# Patient Record
Sex: FEMALE | Race: WHITE | URBAN - METROPOLITAN AREA
[De-identification: names, ages, dates, MRNs, and addresses within clinical notes are randomized per-mention and may not be internally consistent; named-entity substitution may affect disease eponyms.]

---

## 2023-02-06 ENCOUNTER — OFFICE VISIT (OUTPATIENT)
Dept: INTERNAL MEDICINE CLINIC | Age: 48
End: 2023-02-06
Payer: COMMERCIAL

## 2023-02-06 VITALS
HEIGHT: 66 IN | OXYGEN SATURATION: 99 % | HEART RATE: 60 BPM | WEIGHT: 133 LBS | DIASTOLIC BLOOD PRESSURE: 92 MMHG | BODY MASS INDEX: 21.38 KG/M2 | RESPIRATION RATE: 16 BRPM | SYSTOLIC BLOOD PRESSURE: 138 MMHG | TEMPERATURE: 98.8 F

## 2023-02-06 DIAGNOSIS — M75.41 IMPINGEMENT SYNDROME OF RIGHT SHOULDER: Primary | ICD-10-CM

## 2023-02-06 PROCEDURE — 99214 OFFICE O/P EST MOD 30 MIN: CPT | Performed by: FAMILY MEDICINE

## 2023-02-06 RX ORDER — NAPROXEN 500 MG/1
500 TABLET ORAL 2 TIMES DAILY WITH MEALS
Qty: 30 TABLET | Refills: 1 | Status: SHIPPED | OUTPATIENT
Start: 2023-02-06

## 2023-02-06 NOTE — PROGRESS NOTES
Chief Complaint   Patient presents with    Shoulder Pain     Patient is here for right shoulder pain     she is a 41y.o. year old female who presents for evaluation of right shoulder   Pain Assessment Encounter      [facility]  2/6/2023  Onset of Symptoms: Patient states she has had pain for months   ________________________________________________________________________  Description:Patient states she has pole class and thinks that's how she may have injured her shoulder. Patient states she notice more pain when rotating     Frequency: 3-4 times a day  Pain Scale:(1-10): 6  Trauma Hx: none  Hx of similar symptoms: No:   Radiation: YES, arm  Duration:  continuous      Progression: has worsened  What makes it better?: OTC meds  What makes it worse?:exercise and strecthing  Medications tried: acetaminophen    Reviewed and agree with Nurse Note and duplicated in this note. Reviewed PmHx, RxHx, FmHx, SocHx, AllgHx and updated and dated in the chart. No family history on file. No past medical history on file. Social History     Socioeconomic History    Marital status:         Review of Systems - negative except as listed above      Objective:     Vitals:    02/06/23 1323   BP: (!) 138/92   Pulse: 60   Resp: 16   Temp: 98.8 °F (37.1 °C)   SpO2: 99%   Weight: 133 lb (60.3 kg)   Height: 5' 6\" (1.676 m)       Physical Examination: General appearance - alert, well appearing, and in no distress  Back exam - full range of motion, no tenderness, palpable spasm or pain on motion  Neurological - alert, oriented, normal speech, no focal findings or movement disorder noted  Musculoskeletal - The right shoulder is  normal to inspection. The patient has diminished range of motion  . The shoulderis not tender to palpation . Bicep tendon: non-tender  The NEER test is positive. The Claudio test:is positive   The Cross over test:is  negative. The Empty Can test:is  positive.   Stressed ext rotation is: negative. Stressed int rotation: negative. The Apprehension Sign is negative. The Lift off test is:  negative   Examination reveals the Painful Arch:  positive. The Labral Test is:  negative. The Sulcus Sign is:  negative. Extremities - peripheral pulses normal, no pedal edema, no clubbing or cyanosis  Skin - normal coloration and turgor, no rashes, no suspicious skin lesions noted   Indications for study: left shoulder pain      A limited  musculoskeletal ultrasound examination was performed on the left shoulder with the following findings: Biceps (LHB) tendon - long:  normal echogenic, linearly compact fibrillar appearance   Biceps (LHB) tendon - trans:  normal echogenic, tessellate compact fibrillar appearance   Subscapularis tendon - long: normal echogenic, linearly compact fibrillar birds-beak appearance   Subscapularis tendon - trans:  normal echogenic, tessellate compact fibrillar three-bundled appearance   Acromioclavicular joint - trans: normal joint-space without effusion or intra-articular debris   Subacromial (SA) impingement testing: Decreased maintenance of SA space and restricted supraspinatus glide  Supraspinatus tendon - long: normal echogenic, linearly compact fibrillar birds-beak appearance     Impression: Shoulder impingement    Scanned and Interpreted by Brenda Chery [facility]       Assessment/ Plan:   Diagnoses and all orders for this visit:    1.  Impingement syndrome of right shoulder  -     XR SHOULDER RT AP/LAT MIN 2 V; Future  -     REFERRAL TO PHYSICAL THERAPY       Pathophysiology, recovery and rehabilitation process discussed and questions answered   Counseling for 30 Minutes of the total visit duration   Pictures and figures used as necessary   Provided reassurance   Monitor response to home exercises   Recommend activity modification   Recommend  lower impact activities-walking, Eliptical, Nordic Track, cycling or swimming              1) Remember to stay active and/or exercise regularly (I suggest 30-45 minutes daily)   2) For reliable dietary information, go to www. EATRIGHT.org. You may wish to consider seeing the nutritionist at [facility] [phone], also consider the [address] I have discussed the diagnosis with the patient and the intended plan as seen in the above orders. The patient has received an after-visit summary and questions were answered concerning future plans. Medication Side Effects and Warnings were discussed with patient,  Patient Labs were reviewed and or requested, and  Patient Past Records were reviewed and or requested  yes      Pt agrees to call or return to clinic and/or go to closest ER with any worsening of symptoms. This may include, but not limited to increased fever (>100.4) with NSAIDS or Tylenol, increased edema, confusion, rash, worsening of presenting symptoms. Please note that this dictation was completed with [employer], the computer voice recognition software. Quite often unanticipated grammatical, syntax, homophones, and other interpretive errors are inadvertently transcribed by the computer software. Please disregard these errors. Please excuse any errors that have escaped final proofreading. Thank you.

## 2023-02-15 ENCOUNTER — HOSPITAL ENCOUNTER (OUTPATIENT)
Dept: PHYSICAL THERAPY | Age: 48
Discharge: HOME | End: 2023-02-15
Payer: COMMERCIAL

## 2023-02-15 DIAGNOSIS — M75.41 IMPINGEMENT SYNDROME OF RIGHT SHOULDER: Primary | ICD-10-CM

## 2023-02-15 PROCEDURE — 97110 THERAPEUTIC EXERCISES: CPT | Performed by: PHYSICAL THERAPIST

## 2023-02-15 PROCEDURE — 97161 PT EVAL LOW COMPLEX 20 MIN: CPT | Performed by: PHYSICAL THERAPIST

## 2023-02-15 NOTE — PROGRESS NOTES
PT INITIAL EVALUATION NOTE 2-15    Patient Name: Peggy Pena  Date:2/15/2023  : 1975  [x]  Patient  Verified  Payor: Zack Brown / Plan: Shayla Segura PPO / Product Type: PPO /    In time:1005a  Out time:1045a  Total Treatment Time (min): 40  Visit #: 1     Treatment Area: Impingement syndrome of right shoulder [M75.41]    SUBJECTIVE  Pain Level (0-10 scale): 2  Any medication changes, allergies to medications, adverse drug reactions, diagnosis change, or new procedure performed?: [] No    [x] Yes (see summary sheet for update)  Subjective:     Patient reports with R shoulder pain dating back to 2022. She was doing a pole class and noticed some of the repetitive movements bothered her. She hasn't gone back to classes since December. She likes to participate in aerial classes. Overall feels her symptoms are about the same since December. She works as a  at [employer], and notices pain at work. 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 Description of symptoms: lateral upper arm near deltoid insertion  Aggravating Factors: reaching behind back, reaching out, working, aerial/pole classes  Alleviating Factors: rest, naproxen (possibly)    Radiation: none reported    Patient reports functional limitations with: aerial/pole classes, working, reaching behind back, getting dressed/undressed    OBJECTIVE/EXAMINATION  Posture:  mild forward shoulders  Other Observations:  upper trap compensatory movements with shoulder elevation  Palpation: TTP around anterior and lateral shoulder       (active/passive)  (active/passive)  Shoulder ROM:   R    L   Flexion   120/135deg   165deg   Abduction  125deg    160deg     IR   L5 p!/35deg   T6   ER   T2/30deg p!    T5    Joint Mobility Assessment: Glenohumeral: reduced posterior mobility UPPER QUARTER   MUSCLE STRENGTH  KEY       R  L  0 - No Contraction   Flexion  --  --  1 - Trace    Extension --  --  2 - Poor    Abduction --  --  3 - Fair     IR  5  5  4 - Good    ER  4+  5  5 - Normal    Mid-Trap --  --    Neurological: Reflexes / Sensations: nt  Special Tests: Antonia: (+) Repeated scaption: increased protraction in the scapula      Load and Shift: (-)            10 min Therapeutic Exercise:  [x] See flow sheet :   Rationale: increase ROM and increase strength to improve the patients ability to use arm without pain          With   [] TE   [] TA   [] Neuro   [] SC   [] other: Patient Education: [x] Review HEP    [] Progressed/Changed HEP based on:   [] positioning   [] body mechanics   [] transfers   [] heat/ice application    [] other:        Other Objective/Functional Measures: FOTO Functional Measure: 61/100    Pain Level (0-10 scale) post treatment: 2      ASSESSMENT:      [x]  See Plan of GARIMA David, DPT 2/15/2023

## 2023-02-15 NOTE — THERAPY EVALUATION
Physical Therapy at [facility],   a part of [facility]  [address] [address], [address] [place] [address], [address] [place] [address]  Phone: [phone]  Fax: [phone]    Plan of Care/Statement of Necessity for Physical Therapy Services  2-15    Patient name: Lexa Yepez  : 1975  Provider#: [id number]  Referral source: Trsih Sam MD      Medical/Treatment Diagnosis: Impingement syndrome of right shoulder [M75.41]     Prior Hospitalization: see medical history     Comorbidities: none reported  Prior Level of Function: able to use arm for pole classes and working as a  without difficulty  Medications: Verified on Patient Summary List    Start of Care: 2/15/23      Onset Date: 2022       The Plan of Care and following information is based on the information from the initial evaluation. Assessment/ key information: Patient reports with 6 month history of R shoulder pain and limited function. She has positive impingement signs, and also a significant loss of ROM in a capsular pattern. Presentation consistent with R shoulder impingement with secondary adhesive capsulitis.      Evaluation Complexity History LOW Complexity : Zero comorbidities / personal factors that will impact the outcome / POC; Examination HIGH Complexity : 4+ Standardized tests and measures addressing body structure, function, activity limitation and / or participation in recreation  ;Presentation LOW Complexity : Stable, uncomplicated  ;Clinical Decision Making MEDIUM Complexity : FOTO score of 26-74  Overall Complexity Rating: LOW     Problem List: pain affecting function, reduced ROM, reduced strength  Treatment Plan may include any combination of the following: Therapeutic exercise, Neuromuscular reeducation, Manual therapy, Therapeutic activity, Self care/home management, Electric stim unattended , and Vasopneumatic device  Patient / Family readiness to learn indicated by: asking questions and interest  Persons(s) to be included in education: patient (P)  Barriers to Learning/Limitations: None  Patient Goal (s): healing injury  Patient Self Reported Health Status: good  Rehabilitation Potential: fair    Long Term Goals: To be accomplished in 12-16 treatments:   1. Patient will be able to achieve at least 70deg of passive ER for improved function   2. Patient will be able to reach behind her back to put on her apron without pain   3. Pt will be able to work on the floor at [employer] without increased pain   4. Pt will be able to self-manage care using updated HEP for improved independence   5. Improved FOTO score to 71 or better to demonstrate improved function  Frequency / Duration: Patient to be seen 1-2 times per week for 12-16 treatments. Patient/ Caregiver education and instruction: self care and exercises    [x]  Plan of care has been reviewed with PTA Dollie Blizzard, PT, DPT 2/15/2023     ________________________________________________________________________    I certify that the above Therapy Services are being furnished while the patient is under my care. I agree with the treatment plan and certify that this therapy is necessary.     [de-identified] Signature:____________________  Date:____________Time: _________      Hector Goldman MD

## 2023-02-22 ENCOUNTER — HOSPITAL ENCOUNTER (OUTPATIENT)
Dept: PHYSICAL THERAPY | Age: 48
Discharge: HOME | End: 2023-02-22
Payer: COMMERCIAL

## 2023-02-22 PROCEDURE — 97140 MANUAL THERAPY 1/> REGIONS: CPT

## 2023-02-22 PROCEDURE — 97110 THERAPEUTIC EXERCISES: CPT

## 2023-02-22 NOTE — PROGRESS NOTES
PT DAILY TREATMENT NOTE - [facility] 2-15    Patient Name: Arely Lee  Date:2023  : 1975  [x]  Patient  Verified  Payor: Shana Wilson / Plan: Selene Apley PPO / Product Type: PPO /    In time: 7:45A  Out time: 8:34A  Total Treatment Time (min): 49  Total Timed Codes (min): 49  1:1 Treatment Time ( only): 41   Visit #:  2    Treatment Area: Pain in right shoulder [M25.511]    SUBJECTIVE  Pain Level (0-10 scale): 0/10  Any medication changes, allergies to medications, adverse drug reactions, diagnosis change, or new procedure performed?: [x] No    [] Yes (see summary sheet for update)  Subjective functional status/changes:   [] No changes reported  Pt reported shoulder is doing good. HEP is going well. OBJECTIVE    41 min Therapeutic Exercise:  [x] See flow sheet : PROM R shoulder flexion. ER and IR   Rationale: increase ROM, increase strength, improve coordination, improve balance, and increase proprioception to improve the patients ability to reach overhead    8 min Manual Therapy: R pec release, STM/MFR R biceps    Rationale: decrease pain, increase ROM, increase tissue extensibility, decrease edema , decrease trigger points, and increase postural awareness to improve the patients ability to increase mobility    With   [] TE   [] TA   [] Neuro   [] SC   [] other: Patient Education: [x] Review HEP    [] Progressed/Changed HEP based on:   [] positioning   [] body mechanics   [] transfers   [] heat/ice application    [] other:      Other Objective/Functional Measures: --     Pain Level (0-10 scale) post treatment: 0/10    ASSESSMENT/Changes in Function:   Pt tolerated session well. Reported some tension at end range flexion and ER today. Will continue to progress ROM and scapular stabilization per tolerance.    Patient will continue to benefit from skilled PT services to modify and progress therapeutic interventions, address functional mobility deficits, address ROM deficits, address strength deficits, analyze and address soft tissue restrictions, analyze and cue movement patterns, analyze and modify body mechanics/ergonomics, assess and modify postural abnormalities, and instruct in home and community integration to attain remaining goals. []  See Plan of Care  []  See progress note/recertification  []  See Discharge Summary         Progress towards goals / Updated goals:  Long Term Goals: To be accomplished in 12-16 treatments:               1. Patient will be able to achieve at least 70deg of passive ER for improved function               2. Patient will be able to reach behind her back to put on her apron without pain               3. Pt will be able to work on the floor at [facility] without increased pain               4. Pt will be able to self-manage care using updated HEP for improved independence               5. Improved FOTO score to 71 or better to demonstrate improved function  Frequency / Duration: Patient to be seen 1-2 times per week for 12-16 treatments.     PLAN  []  Upgrade activities as tolerated     []  Continue plan of care  []  Update interventions per flow sheet       []  Discharge due to:_  []  Other:_      Nette Mccallum, PTA 2/22/2023

## 2023-03-01 ENCOUNTER — HOSPITAL ENCOUNTER (OUTPATIENT)
Dept: PHYSICAL THERAPY | Age: 48
Discharge: HOME | End: 2023-03-01
Payer: COMMERCIAL

## 2023-03-01 PROCEDURE — 97140 MANUAL THERAPY 1/> REGIONS: CPT

## 2023-03-01 PROCEDURE — 97110 THERAPEUTIC EXERCISES: CPT

## 2023-03-01 NOTE — PROGRESS NOTES
PT DAILY TREATMENT NOTE - [facility] 2-15    Patient Name: Bryan Walker  Date:3/1/2023  : 1975  [x]  Patient  Verified  Payor: Ezekiel Yun / Plan: [employer] PPO / Product Type: PPO /    In time: 10:05A  Out time: 11:08A  Total Treatment Time (min): 63  Total Timed Codes (min): 55  1:1 Treatment Time ( only):--  Visit #:  3    Treatment Area: Pain in right shoulder [M25.511]    SUBJECTIVE  Pain Level (0-10 scale): 0/10  Any medication changes, allergies to medications, adverse drug reactions, diagnosis change, or new procedure performed?: [x] No    [] Yes (see summary sheet for update)  Subjective functional status/changes:   [] No changes reported  pt repots shoulder is a little achy today but not bad. OBJECTIVE    Modality rationale: decrease inflammation, decrease pain, and increase tissue extensibility to improve the patients ability to decrease shoulder pain   Min Type Additional Details       [] Estim: []Att   []Unatt    []TENS instruct                  []IFC  []Premod   []NMES                     []Other:  []w/US   []w/ice   []w/heat  Position:  Location:       []  Traction: [] Cervical       []Lumbar                       [] Prone          []Supine                       []Intermittent   []Continuous Lbs:  [] before manual  [] after manual  []w/heat    []  Ultrasound: []Continuous   [] Pulsed                       at: []1MHz   []3MHz Location:  W/cm2:    [] Paraffin         Location:   []w/heat   8 []  Ice     [x]  Heat (pre)  []  Ice massage Position: seated  Location: R shoulder    []  Laser  []  Other: Position:  Location:      []  Vasopneumatic Device Pressure:       [] lo [] med [] hi   Temperature:      [x] Skin assessment post-treatment:  [x]intact []redness- no adverse reaction    []redness - adverse reaction:     45 min Therapeutic Exercise:  [x] See flow sheet : PROM R shoulder flexion.  ER and IR   Rationale: increase ROM, increase strength, improve coordination, improve balance, and increase proprioception to improve the patients ability to reach overhead    10 min Manual Therapy: R pec release, STM/MFR R biceps, Gd II posterior inferior GHJ mobs   Rationale: decrease pain, increase ROM, increase tissue extensibility, decrease edema , decrease trigger points, and increase postural awareness to improve the patients ability to increase mobility    With   [] TE   [] TA   [] Neuro   [] SC   [] other: Patient Education: [x] Review HEP    [] Progressed/Changed HEP based on:   [] positioning   [] body mechanics   [] transfers   [] heat/ice application    [] other:      Other Objective/Functional Measures: --     Pain Level (0-10 scale) post treatment: 0/10    ASSESSMENT/Changes in Function:   Pt challenged with ER/IR walkouts in maintaining proper posturing and from during movement. Patient will continue to benefit from skilled PT services to modify and progress therapeutic interventions, address functional mobility deficits, address ROM deficits, address strength deficits, analyze and address soft tissue restrictions, analyze and cue movement patterns, analyze and modify body mechanics/ergonomics, assess and modify postural abnormalities, and instruct in home and community integration to attain remaining goals. []  See Plan of Care  []  See progress note/recertification  []  See Discharge Summary       Progress towards goals / Updated goals:  Long Term Goals:  To be accomplished in 12-16 treatments:               1. Patient will be able to achieve at least 70deg of passive ER for improved function               2. Patient will be able to reach behind her back to put on her apron without pain               3. Pt will be able to work on the floor at [facility] without increased pain               4. Pt will be able to self-manage care using updated HEP for improved independence               5. Improved FOTO score to 71 or better to demonstrate improved function  Frequency / Duration: Patient to be seen 1-2 times per week for 12-16 treatments.     PLAN  []  Upgrade activities as tolerated     []  Continue plan of care  []  Update interventions per flow sheet       []  Discharge due to:_  []  Other:_      Julia Polk, NADIA 3/1/2023

## 2023-03-03 ENCOUNTER — HOSPITAL ENCOUNTER (OUTPATIENT)
Dept: PHYSICAL THERAPY | Age: 48
Discharge: HOME | End: 2023-03-03
Payer: COMMERCIAL

## 2023-03-03 PROCEDURE — 97110 THERAPEUTIC EXERCISES: CPT | Performed by: PHYSICAL THERAPIST

## 2023-03-03 PROCEDURE — 97140 MANUAL THERAPY 1/> REGIONS: CPT | Performed by: PHYSICAL THERAPIST

## 2023-03-03 NOTE — PROGRESS NOTES
PT DAILY TREATMENT NOTE 2-15    Patient Name: Kristin Jack  Date:3/3/2023  : 1975  [x]  Patient  Verified  Payor: Rj Shaw / Plan: Canelo Sandra PPO / Product Type: PPO /    In time: 1059a  Out time: 5550T  Total Treatment Time (min): 62  Visit #:  4    Treatment Area: Pain in right shoulder [M25.511]    SUBJECTIVE  Pain Level (0-10 scale): \"sore\"  Any medication changes, allergies to medications, adverse drug reactions, diagnosis change, or new procedure performed?: [x] No    [] Yes (see summary sheet for update)  Subjective functional status/changes:   [] No changes reported  Doing fine. Was sore after last visit, but otherwise doing well. OBJECTIVE           Modality rationale: decrease inflammation, decrease pain, and increase tissue extensibility to improve the patients ability to decrease shoulder pain    Min Type Additional Details        [] Estim: []Att   []Unatt    []TENS instruct                  []IFC  []Premod   []NMES                     []Other:  []w/US   []w/ice   []w/heat  Position:  Location:        []  Traction: [] Cervical       []Lumbar                       [] Prone          []Supine                       []Intermittent   []Continuous Lbs:  [] before manual  [] after manual  []w/heat     []  Ultrasound: []Continuous   [] Pulsed                       at: []1MHz   []3MHz Location:  W/cm2:     [] Paraffin         Location:   []w/heat   8 []  Ice     [x]  Heat (pre)  []  Ice massage Position: seated  Location: R shoulder     []  Laser  []  Other: Position:  Location:        []  Vasopneumatic Device Pressure:       [] lo [] med [] hi   Temperature:       [x] Skin assessment post-treatment:  [x]intact []redness- no adverse reaction    []redness - adverse reaction:      40 min Therapeutic Exercise:  [x] See flow sheet : PROM R shoulder flexion.  ER and IR   Rationale: increase ROM, increase strength, improve coordination, improve balance, and increase proprioception to improve the patients ability to reach overhead     10 min Manual Therapy: R pec release, STM/MFR R biceps, Gd II posterior inferior GHJ mobs   Rationale: decrease pain, increase ROM, increase tissue extensibility, decrease edema , decrease trigger points, and increase postural awareness to improve the patients ability to increase mobility     With   [] TE   [] TA   [] Neuro   [] SC   [] other: Patient Education: [x] Review HEP    [] Progressed/Changed HEP based on:   [] positioning   [] body mechanics   [] transfers   [] heat/ice application    [] other:       Other Objective/Functional Measures: Shoulder ROM  PROM: 60deg ER, 53deg IR         Pain Level (0-10 scale) post treatment: 0/10     ASSESSMENT/Changes in Function:   Overall her passive IR and ER is significantly improved, however AROM remains stagnant. Will keep working in ROM and hopefully AROM will follow and begin to improve as well. Patient will continue to benefit from skilled PT services to modify and progress therapeutic interventions, address functional mobility deficits, address ROM deficits, address strength deficits, analyze and address soft tissue restrictions, analyze and cue movement patterns, analyze and modify body mechanics/ergonomics, assess and modify postural abnormalities, and instruct in home and community integration to attain remaining goals. []  See Plan of Care  []  See progress note/recertification  []  See Discharge Summary       Progress towards goals / Updated goals:  Long Term Goals:  To be accomplished in 12-16 treatments:               1. Patient will be able to achieve at least 70deg of passive ER for improved function               2. Patient will be able to reach behind her back to put on her apron without pain               3. Pt will be able to work on the floor at [facility] without increased pain               4. Pt will be able to self-manage care using updated HEP for improved independence               5. Improved FOTO score to 71 or better to demonstrate improved function  Frequency / Duration: Patient to be seen 1-2 times per week for 12-16 treatments.      PLAN  []  Upgrade activities as tolerated     []  Continue plan of care  []  Update interventions per flow sheet       []  Discharge due to:_  []  Other:_      Duran High, PT, DPT 3/3/2023

## 2023-03-07 ENCOUNTER — HOSPITAL ENCOUNTER (OUTPATIENT)
Dept: PHYSICAL THERAPY | Age: 48
Discharge: HOME | End: 2023-03-07
Payer: COMMERCIAL

## 2023-03-07 PROCEDURE — 97110 THERAPEUTIC EXERCISES: CPT | Performed by: PHYSICAL THERAPIST

## 2023-03-07 PROCEDURE — 97140 MANUAL THERAPY 1/> REGIONS: CPT | Performed by: PHYSICAL THERAPIST

## 2023-03-07 NOTE — PROGRESS NOTES
PT DAILY TREATMENT NOTE 2-15    Patient Name: Keon Veronica  Date:3/7/2023  : 1975  [x]  Patient  Verified  Payor: Ciara Blunt / Plan: Hermelinda Jessica PPO / Product Type: PPO /    In time: a  Out time: 239P  Total Treatment Time (min): 51  Visit #:  5    Treatment Area: Pain in right shoulder [M25.511]    SUBJECTIVE  Pain Level (0-10 scale): 0  Any medication changes, allergies to medications, adverse drug reactions, diagnosis change, or new procedure performed?: [x] No    [] Yes (see summary sheet for update)  Subjective functional status/changes:   [] No changes reported  Doing pretty well. Feeling it less while lying down. OBJECTIVE             Modality rationale: decrease inflammation, decrease pain, and increase tissue extensibility to improve the patients ability to decrease shoulder pain     Min Type Additional Details        [] Estim: []Att   []Unatt    []TENS instruct                  []IFC  []Premod   []NMES                     []Other:  []w/US   []w/ice   []w/heat  Position:  Location:        []  Traction: [] Cervical       []Lumbar                       [] Prone          []Supine                       []Intermittent   []Continuous Lbs:  [] before manual  [] after manual  []w/heat     []  Ultrasound: []Continuous   [] Pulsed                       at: []1MHz   []3MHz Location:  W/cm2:     [] Paraffin         Location:   []w/heat   7 []  Ice     [x]  Heat (pre)  []  Ice massage Position: seated  Location: R shoulder     []  Laser  []  Other: Position:  Location:        []  Vasopneumatic Device Pressure:       [] lo [] med [] hi   Temperature:       [x] Skin assessment post-treatment:  [x]intact []redness- no adverse reaction    []redness - adverse reaction:      34 min Therapeutic Exercise:  [x] See flow sheet : PROM R shoulder flexion.  ER and IR   Rationale: increase ROM, increase strength, improve coordination, improve balance, and increase proprioception to improve the patients ability to reach overhead     10 min Manual Therapy: R pec release, STM/MFR R biceps, Gd II posterior inferior GHJ mobs   Rationale: decrease pain, increase ROM, increase tissue extensibility, decrease edema , decrease trigger points, and increase postural awareness to improve the patients ability to increase mobility     With   [] TE   [] TA   [] Neuro   [] SC   [] other: Patient Education: [x] Review HEP    [] Progressed/Changed HEP based on:   [] positioning   [] body mechanics   [] transfers   [] heat/ice application    [] other:       Other Objective/Functional Measures: --     Pain Level (0-10 scale) post treatment: 0/10     ASSESSMENT/Changes in Function:   Did well today and had better AROM flexion after step back AAROM stretching on wall. Patient will continue to benefit from skilled PT services to modify and progress therapeutic interventions, address functional mobility deficits, address ROM deficits, address strength deficits, analyze and address soft tissue restrictions, analyze and cue movement patterns, analyze and modify body mechanics/ergonomics, assess and modify postural abnormalities, and instruct in home and community integration to attain remaining goals. []  See Plan of Care  []  See progress note/recertification  []  See Discharge Summary       Progress towards goals / Updated goals:  Long Term Goals: To be accomplished in 12-16 treatments:               1. Patient will be able to achieve at least 70deg of passive ER for improved function               2. Patient will be able to reach behind her back to put on her apron without pain               3. Pt will be able to work on the floor at [facility] without increased pain               4. Pt will be able to self-manage care using updated HEP for improved independence               5. Improved FOTO score to 71 or better to demonstrate improved function  Frequency / Duration: Patient to be seen 1-2 times per week for 12-16 treatments.      PLAN  [x] Upgrade activities as tolerated     [x]  Continue plan of care  []  Update interventions per flow sheet       []  Discharge due to:_  []  Other:_      Elodia León, PT, DPT 3/7/2023

## 2023-03-14 ENCOUNTER — HOSPITAL ENCOUNTER (OUTPATIENT)
Dept: PHYSICAL THERAPY | Age: 48
Discharge: HOME | End: 2023-03-14
Payer: COMMERCIAL

## 2023-03-14 PROCEDURE — 97110 THERAPEUTIC EXERCISES: CPT | Performed by: PHYSICAL THERAPIST

## 2023-03-14 PROCEDURE — 97140 MANUAL THERAPY 1/> REGIONS: CPT | Performed by: PHYSICAL THERAPIST

## 2023-03-14 NOTE — PROGRESS NOTES
PT DAILY TREATMENT NOTE 2-15    Patient Name: Verenice Dire  Date:3/14/2023  : 1975  [x]  Patient  Verified  Payor: Kaya Maderaer / Plan: Josephine Murrell PPO / Product Type: PPO /    In time: 5a  Out time: 1110a  Total Treatment Time (min): 55  Visit #:  6    Treatment Area: Pain in right shoulder [M25.511]    SUBJECTIVE  Pain Level (0-10 scale): 1-2  Any medication changes, allergies to medications, adverse drug reactions, diagnosis change, or new procedure performed?: [x] No    [] Yes (see summary sheet for update)  Subjective functional status/changes:   [] No changes reported  Doing well today. Shoulder is a little sore from picking up her niece yesterday. OBJECTIVE            Modality rationale: decrease inflammation, decrease pain, and increase tissue extensibility to improve the patients ability to decrease shoulder pain     Min Type Additional Details        [] Estim: []Att   []Unatt    []TENS instruct                  []IFC  []Premod   []NMES                     []Other:  []w/US   []w/ice   []w/heat  Position:  Location:        []  Traction: [] Cervical       []Lumbar                       [] Prone          []Supine                       []Intermittent   []Continuous Lbs:  [] before manual  [] after manual  []w/heat     []  Ultrasound: []Continuous   [] Pulsed                       at: []1MHz   []3MHz Location:  W/cm2:     [] Paraffin         Location:   []w/heat   5 []  Ice     [x]  Heat (pre)  []  Ice massage Position: seated  Location: R shoulder     []  Laser  []  Other: Position:  Location:        []  Vasopneumatic Device Pressure:       [] lo [] med [] hi   Temperature:       [x] Skin assessment post-treatment:  [x]intact []redness- no adverse reaction    []redness - adverse reaction:      42 min Therapeutic Exercise:  [x] See flow sheet : PROM R shoulder flexion.  ER and IR   Rationale: increase ROM, increase strength, improve coordination, improve balance, and increase proprioception to improve the patients ability to reach overhead     8 min Manual Therapy: Gd II posterior inferior GH mobs   Rationale: decrease pain, increase ROM, increase tissue extensibility, decrease edema , decrease trigger points, and increase postural awareness to improve the patients ability to increase mobility     With   [] TE   [] TA   [] Neuro   [] SC   [] other: Patient Education: [x] Review HEP    [] Progressed/Changed HEP based on:   [] positioning   [] body mechanics   [] transfers   [] heat/ice application    [] other:       Other Objective/Functional Measures: Shoulder flex: 132deg     Pain Level (0-10 scale) post treatment: 0/10     ASSESSMENT/Changes in Function:   Able to gain more active shoulder flexion today. Will continue to progress ROM per tolerance. Patient will continue to benefit from skilled PT services to modify and progress therapeutic interventions, address functional mobility deficits, address ROM deficits, address strength deficits, analyze and address soft tissue restrictions, analyze and cue movement patterns, analyze and modify body mechanics/ergonomics, assess and modify postural abnormalities, and instruct in home and community integration to attain remaining goals. []  See Plan of Care  []  See progress note/recertification  []  See Discharge Summary       Progress towards goals / Updated goals:  Long Term Goals:  To be accomplished in 12-16 treatments:               1. Patient will be able to achieve at least 70deg of passive ER for improved function               2. Patient will be able to reach behind her back to put on her apron without pain               3. Pt will be able to work on the floor at [facility] without increased pain               4. Pt will be able to self-manage care using updated HEP for improved independence               5. Improved FOTO score to 71 or better to demonstrate improved function  Frequency / Duration: Patient to be seen 1-2 times per week for 12-16 treatments.      PLAN  [x]  Upgrade activities as tolerated     [x]  Continue plan of care  []  Update interventions per flow sheet       []  Discharge due to:_  []  Other:_      Teresa Brody PT, DPT 3/14/2023

## 2023-03-16 ENCOUNTER — HOSPITAL ENCOUNTER (OUTPATIENT)
Dept: PHYSICAL THERAPY | Age: 48
Discharge: HOME | End: 2023-03-16
Payer: COMMERCIAL

## 2023-03-16 PROCEDURE — 97110 THERAPEUTIC EXERCISES: CPT

## 2023-03-16 PROCEDURE — 97140 MANUAL THERAPY 1/> REGIONS: CPT

## 2023-03-16 NOTE — PROGRESS NOTES
PT DAILY TREATMENT NOTE 2-15    Patient Name: Cipriano Blue  Date:3/16/2023  : 1975  [x]  Patient  Verified  Payor: Levi Aviles / Plan: Sunil Hammond PPO / Product Type: PPO /    In time: 11:45A Out time: 12:35P  Total Treatment Time (min): 50  Visit #:  7    Treatment Area: Pain in right shoulder [M25.511]    SUBJECTIVE  Pain Level (0-10 scale): 0/10  Any medication changes, allergies to medications, adverse drug reactions, diagnosis change, or new procedure performed?: [x] No    [] Yes (see summary sheet for update)  Subjective functional status/changes:   [] No changes reported  Pt reported shoulder is doing well today. OBJECTIVE            Modality rationale: decrease inflammation, decrease pain, and increase tissue extensibility to improve the patients ability to decrease shoulder pain     Min Type Additional Details        [] Estim: []Att   []Unatt    []TENS instruct                  []IFC  []Premod   []NMES                     []Other:  []w/US   []w/ice   []w/heat  Position:  Location:        []  Traction: [] Cervical       []Lumbar                       [] Prone          []Supine                       []Intermittent   []Continuous Lbs:  [] before manual  [] after manual  []w/heat     []  Ultrasound: []Continuous   [] Pulsed                       at: []1MHz   []3MHz Location:  W/cm2:     [] Paraffin         Location:   []w/heat   8 []  Ice     [x]  Heat (pre)  []  Ice massage Position: seated  Location: R shoulder     []  Laser  []  Other: Position:  Location:        []  Vasopneumatic Device Pressure:       [] lo [] med [] hi   Temperature:       [x] Skin assessment post-treatment:  [x]intact []redness- no adverse reaction    []redness - adverse reaction:      32 min Therapeutic Exercise:  [x] See flow sheet : PROM R shoulder flexion.  ER and IR   Rationale: increase ROM, increase strength, improve coordination, improve balance, and increase proprioception to improve the patients ability to reach overhead     10 min Manual Therapy: Gd II posterior inferior GH mobs   Rationale: decrease pain, increase ROM, increase tissue extensibility, decrease edema , decrease trigger points, and increase postural awareness to improve the patients ability to increase mobility     With   [] TE   [] TA   [] Neuro   [] SC   [] other: Patient Education: [x] Review HEP    [] Progressed/Changed HEP based on:   [] positioning   [] body mechanics   [] transfers   [] heat/ice application    [] other:       Other Objective/Functional Measures: Shoulder flex: 142deg     Pain Level (0-10 scale) post treatment: 0/10     ASSESSMENT/Changes in Function:   Pt tolerated session well. Does require cuing for proper form for shoulder extension. Pt demonstrated increase in shoulder shrug compensation. Patient will continue to benefit from skilled PT services to modify and progress therapeutic interventions, address functional mobility deficits, address ROM deficits, address strength deficits, analyze and address soft tissue restrictions, analyze and cue movement patterns, analyze and modify body mechanics/ergonomics, assess and modify postural abnormalities, and instruct in home and community integration to attain remaining goals. []  See Plan of Care  []  See progress note/recertification  []  See Discharge Summary       Progress towards goals / Updated goals:  Long Term Goals:  To be accomplished in 12-16 treatments:               1. Patient will be able to achieve at least 70deg of passive ER for improved function               2. Patient will be able to reach behind her back to put on her apron without pain               3. Pt will be able to work on the floor at [facility] without increased pain               4. Pt will be able to self-manage care using updated HEP for improved independence               5. Improved FOTO score to 71 or better to demonstrate improved function  Frequency / Duration: Patient to be seen 1-2 times per week for 12-16 treatments.      PLAN  [x]  Upgrade activities as tolerated     [x]  Continue plan of care  []  Update interventions per flow sheet       []  Discharge due to:_  []  Other:_      Alicia Hanley, PTA 3/16/2023

## 2023-03-20 ENCOUNTER — OFFICE VISIT (OUTPATIENT)
Dept: INTERNAL MEDICINE CLINIC | Age: 48
End: 2023-03-20
Payer: COMMERCIAL

## 2023-03-20 ENCOUNTER — PATIENT MESSAGE (OUTPATIENT)
Dept: INTERNAL MEDICINE CLINIC | Age: 48
End: 2023-03-20

## 2023-03-20 ENCOUNTER — HOSPITAL ENCOUNTER (OUTPATIENT)
Dept: PHYSICAL THERAPY | Age: 48
Discharge: HOME | End: 2023-03-20
Payer: COMMERCIAL

## 2023-03-20 VITALS
WEIGHT: 134 LBS | SYSTOLIC BLOOD PRESSURE: 123 MMHG | OXYGEN SATURATION: 100 % | HEART RATE: 70 BPM | RESPIRATION RATE: 16 BRPM | TEMPERATURE: 98.1 F | DIASTOLIC BLOOD PRESSURE: 83 MMHG | HEIGHT: 60 IN | BODY MASS INDEX: 26.31 KG/M2

## 2023-03-20 DIAGNOSIS — Z00.00 WELL WOMAN EXAM (NO GYNECOLOGICAL EXAM): Primary | ICD-10-CM

## 2023-03-20 DIAGNOSIS — Z11.59 NEED FOR HEPATITIS C SCREENING TEST: ICD-10-CM

## 2023-03-20 DIAGNOSIS — Z12.4 CERVICAL CANCER SCREENING: ICD-10-CM

## 2023-03-20 DIAGNOSIS — Z12.11 COLON CANCER SCREENING: ICD-10-CM

## 2023-03-20 DIAGNOSIS — M75.41 IMPINGEMENT SYNDROME OF RIGHT SHOULDER: ICD-10-CM

## 2023-03-20 PROCEDURE — 97140 MANUAL THERAPY 1/> REGIONS: CPT

## 2023-03-20 PROCEDURE — 99396 PREV VISIT EST AGE 40-64: CPT | Performed by: FAMILY MEDICINE

## 2023-03-20 PROCEDURE — 97110 THERAPEUTIC EXERCISES: CPT

## 2023-03-20 PROCEDURE — 99214 OFFICE O/P EST MOD 30 MIN: CPT | Performed by: FAMILY MEDICINE

## 2023-03-20 NOTE — PROGRESS NOTES
Physical Therapy at [facility],   a part of [facility] [place]  [address] [address], [address] [place] [address], [address]  Phone: [phone] Fax: [phone]    Progress Note    Name: Cate Munroe   : 1975   MD: Gus Damon MD       Treatment Diagnosis: Pain in right shoulder [M25.511]  Start of Care: 2/15/23    Visits from Start of Care: 8  Missed Visits: --    Summary of Care: Ms. Aaron Leavitt has progressed well over the course of 8 sessions addressing her R shoulder pain. She has made progress with her shoulder ROM, both passively and actively, but still remains limited, particularly in a capsular pattern. Suspect adhesive capsulitis. Shoulder PROM  Flex: 148deg  Abd: 150deg  ER: 61deg    Shoulder AROM  Flex: 146deg  Abd: 140deg    Assessment / Recommendations: Other: Recommend follow up with MD for potential injection. Will continue to benefit from ongoing PT addressing shoulder ROM limitations, pain, and limited activity tolerance.      Mat Fam, PT, DPT 3/20/2023

## 2023-03-20 NOTE — PROGRESS NOTES
Chief Complaint   Patient presents with    Shoulder Pain     Patient is here for right shoulder pain      she is a 41y.o. year old female who presents for CPE  Complete Physical Exam Questions:    LMP -  monthly  Last Pap (q 3 years, or q5 with HPV) - 2 years  Last Mammogram (54-69 biennially)- not done  Hx of abnl Pap - No    1. Do you follow a low fat diet? yes  2. Are you up to date on your Tdap (<10 years)? Unknown  3. Have you ever had a Pneumovax vaccine (>65)? Not applicable   BFT04 Not applicable   [id number] Not applicable  4. Have you had Zoster vaccine (>60)? No  5. Have you had the HPV - Gardasil (13- 26)? No  6. Do you follow an exercise program?  yes  7. Do you smoke?  no  8. Do you consider yourself overweight?  no  9. Is there a family history of CAD< age 48? No  10. Is there a family history of Cancer?  no  11. Do you know your Cancer risks? No  12. Have you had a colonoscopy?  no  13. Have you been tested for HIV or other STI's? No HIV testing today(18-66 y/o)? No  14. Have had a bone density scan or DEXA done(>65)? No  15. Have you had an EKG performed in the last five years (>50)? No    Other complaints:    Reviewed and agree with Nurse Note and duplicated in this note. Reviewed PmHx, RxHx, FmHx, SocHx, AllgHx and updated and dated in the chart. No family history on file. No past medical history on file.    Social History     Socioeconomic History    Marital status:    Tobacco Use    Smoking status: Never    Smokeless tobacco: Never   Substance and Sexual Activity    Alcohol use: Never    Drug use: Never    Sexual activity: Not Currently        Review of Systems - negative except as listed above      Objective:     Vitals:    03/20/23 1350   BP: 123/83   Pulse: 70   Resp: 16   Temp: 98.1 °F (36.7 °C)   SpO2: 100%   Weight: 134 lb (60.8 kg)   Height: 5' (1.524 m)       Physical Examination: General appearance - alert, well appearing, and in no distress  Eyes - pupils equal and reactive, extraocular eye movements intact  Ears - bilateral TM's and external ear canals normal  Nose - normal and patent, no erythema, discharge or polyps  Mouth - mucous membranes moist, pharynx normal without lesions  Neck - supple, no significant adenopathy  Chest - clear to auscultation, no wheezes, rales or rhonchi, symmetric air entry  Heart - normal rate, regular rhythm, normal S1, S2, no murmurs, rubs, clicks or gallops  Abdomen - soft, nontender, nondistended, no masses or organomegaly  Musculoskeletal - no joint tenderness, deformity or swelling  Extremities - peripheral pulses normal, no pedal edema, no clubbing or cyanosis  Skin - normal coloration and turgor, no rashes, no suspicious skin lesions noted      Assessment/ Plan:   Diagnoses and all orders for this visit:    1. Well woman exam (no gynecological exam)  -     CBC W/O DIFF; Future  -     METABOLIC PANEL, COMPREHENSIVE; Future  -     LIPID PANEL; Future    2. Impingement syndrome of right shoulder    3. Possible exposure to STD    4. Colon cancer screening  -     REFERRAL TO GASTROENTEROLOGY    5. Need for hepatitis C screening test  -     HEPATITIS C AB; Future    6. Cervical cancer screening  -     REFERRAL TO OBSTETRICS AND GYNECOLOGY         Labs to be drawn: CBC, CMP, Lipid            I have discussed the diagnosis with the patient and the intended plan as seen in the above orders. The patient has received an after-visit summary and questions were answered concerning future plans. Medication Side Effects and Warnings were discussed with patient,  Patient Labs were reviewed and or requested, and  Patient Past Records were reviewed and or requested  yes         Pt agrees to call or return to clinic and/or go to closest ER with any worsening of symptoms. This may include, but not limited to increased fever (>100.4) with NSAIDS or Tylenol, increased edema, confusion, rash, worsening of presenting symptoms.   Please note that this dictation was completed with [employer], the [employer] voice recognition software. Quite often unanticipated grammatical, syntax, homophones, and other interpretive errors are inadvertently transcribed by the computer software. Please disregard these errors. Please excuse any errors that have escaped final proofreading. Thank you. she is a 41y.o. year old female who presents for follow up of injury. Follow Up Pain Assessment Encounter      Onset of Symptoms: Patient states she has had pain for weeks   ________________________________________________________________________  Description: Pain has slightly improved      Pain Scale:(1-10): 1  Duration:  continuous  Radiation: none  What makes it better?: exercise  What makes it worse?:strecthing  Medications tried: acetaminophen  Modalities tried: PT        Reviewed and agree with Nurse Note and duplicated in this note. Reviewed PmHx, RxHx, FmHx, SocHx, AllgHx and updated and dated in the chart. No family history on file. No past medical history on file. Social History     Socioeconomic History    Marital status:    Tobacco Use    Smoking status: Never    Smokeless tobacco: Never   Substance and Sexual Activity    Alcohol use: Never    Drug use: Never    Sexual activity: Not Currently        Review of Systems - negative except as listed above      Objective:     Vitals:    03/20/23 1350   BP: 123/83   Pulse: 70   Resp: 16   Temp: 98.1 °F (36.7 °C)   SpO2: 100%   Weight: 134 lb (60.8 kg)   Height: 5' (1.524 m)       Physical Examination: General appearance - alert, well appearing, and in no distress  Back exam - full range of motion, no tenderness, palpable spasm or pain on motion  Neurological - alert, oriented, normal speech, no focal findings or movement disorder noted  Musculoskeletal - The right shoulder is  normal to inspection. The patient has diminished range of motion  . The shoulderis not tender to palpation .   Bicep tendon: non-tender  The NEER test is positive. The Claudio test:is positive   The Cross over test:is  negative. The Empty Can test:is  positive. Stressed ext rotation is:  negative. Stressed int rotation: negative. The Apprehension Sign is negative. The Lift off test is:  negative   Examination reveals the Painful Arch:  positive. The Labral Test is:  negative. The Sulcus Sign is:  negative. Extremities - peripheral pulses normal, no pedal edema, no clubbing or cyanosis  Skin - normal coloration and turgor, no rashes, no suspicious skin lesions noted      Assessment/ Plan:   Diagnoses and all orders for this visit:    1. Well woman exam (no gynecological exam)  -     CBC W/O DIFF; Future  -     METABOLIC PANEL, COMPREHENSIVE; Future  -     LIPID PANEL; Future    2. Impingement syndrome of right shoulder  Patient is improving, continue with physical therapy      4. Colon cancer screening  -     REFERRAL TO GASTROENTEROLOGY    5. Need for hepatitis C screening test  -     HEPATITIS C AB; Future    6. Cervical cancer screening  -     REFERRAL TO OBSTETRICS AND GYNECOLOGY         Pathophysiology, recovery and rehabilitation process discussed and questions answered   Counseling for 30 Minutes of the total visit duration   Pictures and figures used as necessary   Provided reassurance   Monitor response to Physical Therapy   Recommend activity modification   Recommend  lower impact activities-walking, Eliptical, Nordic Track, cycling or swimming               I have discussed the diagnosis with the patient and the intended plan as seen in the above orders. The patient has received an after-visit summary and questions were answered concerning future plans.      Medication Side Effects and Warnings were discussed with patient,  Patient Labs were reviewed and or requested, and  Patient Past Records were reviewed and or requested  yes     Pt agrees to call or return to clinic and/or go to closest ER with any worsening of symptoms. This may include, but not limited to increased fever (>100.4) with NSAIDS or Tylenol, increased edema, confusion, rash, worsening of presenting symptoms. Please note that this dictation was completed with [employer], the computer voice recognition software. Quite often unanticipated grammatical, syntax, homophones, and other interpretive errors are inadvertently transcribed by the computer software. Please disregard these errors. Please excuse any errors that have escaped final proofreading. Thank you.

## 2023-03-20 NOTE — PROGRESS NOTES
PT DAILY TREATMENT NOTE 2-15    Patient Name: Maria Del Rosario Rodriguez  Date:3/20/2023  : 1975  [x]  Patient  Verified  Payor: Wilbert Garcia / Plan: Lei Sy PPO / Product Type: PPO /    In time: 1:00P Out time: 1:40P  Total Treatment Time (min): 40  Visit #:  8    Treatment Area: Pain in right shoulder [M25.511]    SUBJECTIVE  Pain Level (0-10 scale): 0/10  Any medication changes, allergies to medications, adverse drug reactions, diagnosis change, or new procedure performed?: [x] No    [] Yes (see summary sheet for update)  Subjective functional status/changes:   [] No changes reported  Pt repots being busy at work over the weekend and short handed shoulder held up okay. OBJECTIVE            Modality rationale: decrease inflammation, decrease pain, and increase tissue extensibility to improve the patients ability to decrease shoulder pain     Min Type Additional Details        [] Estim: []Att   []Unatt    []TENS instruct                  []IFC  []Premod   []NMES                     []Other:  []w/US   []w/ice   []w/heat  Position:  Location:        []  Traction: [] Cervical       []Lumbar                       [] Prone          []Supine                       []Intermittent   []Continuous Lbs:  [] before manual  [] after manual  []w/heat     []  Ultrasound: []Continuous   [] Pulsed                       at: []1MHz   []3MHz Location:  W/cm2:     [] Paraffin         Location:   []w/heat   8 []  Ice     [x]  Heat (pre)  []  Ice massage Position: seated  Location: R shoulder     []  Laser  []  Other: Position:  Location:        []  Vasopneumatic Device Pressure:       [] lo [] med [] hi   Temperature:       [x] Skin assessment post-treatment:  [x]intact []redness- no adverse reaction    []redness - adverse reaction:      22 min Therapeutic Exercise:  [x] See flow sheet : PROM R shoulder flexion.  ER and IR   Rationale: increase ROM, increase strength, improve coordination, improve balance, and increase proprioception to improve the patients ability to reach overhead     8 min Manual Therapy: Gd II posterior inferior GH mobs   Rationale: decrease pain, increase ROM, increase tissue extensibility, decrease edema , decrease trigger points, and increase postural awareness to improve the patients ability to increase mobility     With   [] TE   [] TA   [] Neuro   [] SC   [] other: Patient Education: [x] Review HEP    [] Progressed/Changed HEP based on:   [] positioning   [] body mechanics   [] transfers   [] heat/ice application    [] other:       Other Objective/Functional Measures: Shoulder PROM flex: 148deg  deg ER 61 deg  Shoulder AROM flex: 146 deg Abd 140 deg     Pain Level (0-10 scale) post treatment: 0/10     ASSESSMENT/Changes in Function:   Pt demonstrates some UT compensation with AROM shoulder flexion and ABD but has improved to date. Will continue to progress as tolerated  Patient will continue to benefit from skilled PT services to modify and progress therapeutic interventions, address functional mobility deficits, address ROM deficits, address strength deficits, analyze and address soft tissue restrictions, analyze and cue movement patterns, analyze and modify body mechanics/ergonomics, assess and modify postural abnormalities, and instruct in home and community integration to attain remaining goals. []  See Plan of Care  []  See progress note/recertification  []  See Discharge Summary       Progress towards goals / Updated goals:  Long Term Goals:  To be accomplished in 12-16 treatments:               1. Patient will be able to achieve at least 70deg of passive ER for improved function               2. Patient will be able to reach behind her back to put on her apron without pain               3. Pt will be able to work on the floor at [facility] without increased pain               4. Pt will be able to self-manage care using updated HEP for improved independence               5. Improved FOTO score to 71 or better to demonstrate improved function  Frequency / Duration: Patient to be seen 1-2 times per week for 12-16 treatments.      PLAN  [x]  Upgrade activities as tolerated     [x]  Continue plan of care  []  Update interventions per flow sheet       []  Discharge due to:_  []  Other:_      Korey Dubon, NADIA 3/20/2023

## 2023-03-21 ENCOUNTER — APPOINTMENT (OUTPATIENT)
Dept: PHYSICAL THERAPY | Age: 48
End: 2023-03-21
Payer: COMMERCIAL

## 2023-03-21 LAB
ALBUMIN SERPL-MCNC: 4.6 G/DL (ref 3.8–4.8)
ALBUMIN/GLOB SERPL: 2 {RATIO} (ref 1.2–2.2)
ALP SERPL-CCNC: 78 IU/L (ref 44–121)
ALT SERPL-CCNC: 8 IU/L (ref 0–32)
AST SERPL-CCNC: 20 IU/L (ref 0–40)
BILIRUB SERPL-MCNC: 0.2 MG/DL (ref 0–1.2)
BUN SERPL-MCNC: 18 MG/DL (ref 6–24)
BUN/CREAT SERPL: 27 (ref 9–23)
CALCIUM SERPL-MCNC: 9.5 MG/DL (ref 8.7–10.2)
CHLORIDE SERPL-SCNC: 105 MMOL/L (ref 96–106)
CHOLEST SERPL-MCNC: 208 MG/DL (ref 100–199)
CO2 SERPL-SCNC: 22 MMOL/L (ref 20–29)
CREAT SERPL-MCNC: 0.67 MG/DL (ref 0.57–1)
EGFRCR SERPLBLD CKD-EPI 2021: 110 ML/MIN/1.73
ERYTHROCYTE [DISTWIDTH] IN BLOOD BY AUTOMATED COUNT: 11.9 % (ref 11.7–15.4)
GLOBULIN SER CALC-MCNC: 2.3 G/DL (ref 1.5–4.5)
GLUCOSE SERPL-MCNC: 92 MG/DL (ref 70–99)
HCT VFR BLD AUTO: 41.3 % (ref 34–46.6)
HCV IGG SERPL QL IA: NON REACTIVE
HDLC SERPL-MCNC: 77 MG/DL
HGB BLD-MCNC: 13.9 G/DL (ref 11.1–15.9)
LDLC SERPL CALC-MCNC: 119 MG/DL (ref 0–99)
MCH RBC QN AUTO: 32.2 PG (ref 26.6–33)
MCHC RBC AUTO-ENTMCNC: 33.7 G/DL (ref 31.5–35.7)
MCV RBC AUTO: 96 FL (ref 79–97)
PLATELET # BLD AUTO: 189 X10E3/UL (ref 150–450)
POTASSIUM SERPL-SCNC: 5.3 MMOL/L (ref 3.5–5.2)
PROT SERPL-MCNC: 6.9 G/DL (ref 6–8.5)
RBC # BLD AUTO: 4.32 X10E6/UL (ref 3.77–5.28)
SODIUM SERPL-SCNC: 141 MMOL/L (ref 134–144)
TRIGL SERPL-MCNC: 70 MG/DL (ref 0–149)
VLDLC SERPL CALC-MCNC: 12 MG/DL (ref 5–40)
WBC # BLD AUTO: 5.9 X10E3/UL (ref 3.4–10.8)

## 2023-03-23 ENCOUNTER — HOSPITAL ENCOUNTER (OUTPATIENT)
Dept: PHYSICAL THERAPY | Age: 48
Discharge: HOME | End: 2023-03-23
Payer: COMMERCIAL

## 2023-03-23 PROCEDURE — 97140 MANUAL THERAPY 1/> REGIONS: CPT | Performed by: PHYSICAL THERAPIST

## 2023-03-23 PROCEDURE — 97110 THERAPEUTIC EXERCISES: CPT | Performed by: PHYSICAL THERAPIST

## 2023-03-23 NOTE — PROGRESS NOTES
PT DAILY TREATMENT NOTE 2-15    Patient Name: Rita Gomez  Date:3/23/2023  : 1975  [x]  Patient  Verified  Payor: Rafaela Shin / Plan: Kristopher Blank PPO / Product Type: PPO /    In time: 101  Out time: 1105a  Total Treatment Time (min): 48  Visit #:  9    Treatment Area: Pain in right shoulder [M25.511]    SUBJECTIVE  Pain Level (0-10 scale): 0  Any medication changes, allergies to medications, adverse drug reactions, diagnosis change, or new procedure performed?: [x] No    [] Yes (see summary sheet for update)  Subjective functional status/changes:   [] No changes reported  Doing well today. Didn't end up doing an injection with Dr. Marilia Villar the other day. OBJECTIVE    Modality rationale: decrease inflammation, decrease pain, and increase tissue extensibility to improve the patients ability to decrease shoulder pain     Min Type Additional Details        [] Estim: []Att   []Unatt    []TENS instruct                  []IFC  []Premod   []NMES                     []Other:  []w/US   []w/ice   []w/heat  Position:  Location:        []  Traction: [] Cervical       []Lumbar                       [] Prone          []Supine                       []Intermittent   []Continuous Lbs:  [] before manual  [] after manual  []w/heat     []  Ultrasound: []Continuous   [] Pulsed                       at: []1MHz   []3MHz Location:  W/cm2:     [] Paraffin         Location:   []w/heat   6 []  Ice     [x]  Heat (pre)  []  Ice massage Position: seated  Location: R shoulder     []  Laser  []  Other: Position:  Location:        []  Vasopneumatic Device Pressure:       [] lo [] med [] hi   Temperature:       [x] Skin assessment post-treatment:  [x]intact []redness- no adverse reaction    []redness - adverse reaction:      32 min Therapeutic Exercise:  [x] See flow sheet : PROM R shoulder flexion.  ER and IR   Rationale: increase ROM, increase strength, improve coordination, improve balance, and increase proprioception to improve the patients ability to reach overhead     8 min Manual Therapy: Gd II posterior inferior GH mobs   Rationale: decrease pain, increase ROM, increase tissue extensibility, decrease edema , decrease trigger points, and increase postural awareness to improve the patients ability to increase mobility     With   [] TE   [] TA   [] Neuro   [] SC   [] other: Patient Education: [x] Review HEP    [] Progressed/Changed HEP based on:   [] positioning   [] body mechanics   [] transfers   [] heat/ice application    [] other:       Other Objective/Functional Measures: --     Pain Level (0-10 scale) post treatment: 0/10     ASSESSMENT/Changes in Function:   Doing well, and beginning to work harder into IR and ER AROM movement. Patient will continue to benefit from skilled PT services to modify and progress therapeutic interventions, address functional mobility deficits, address ROM deficits, address strength deficits, analyze and address soft tissue restrictions, analyze and cue movement patterns, analyze and modify body mechanics/ergonomics, assess and modify postural abnormalities, and instruct in home and community integration to attain remaining goals. []  See Plan of Care  []  See progress note/recertification  []  See Discharge Summary       Progress towards goals / Updated goals:  Long Term Goals:  To be accomplished in 12-16 treatments:               1. Patient will be able to achieve at least 70deg of passive ER for improved function PROGRESSING               2. Patient will be able to reach behind her back to put on her apron without pain NOT YET MET               3. Pt will be able to work on the floor at [facility] without increased pain PROGRESSING               4. Pt will be able to self-manage care using updated HEP for improved independence PROGRESSING               5. Improved FOTO score to 71 or better to demonstrate improved function    PLAN  [x]  Upgrade activities as tolerated     [x]  Continue plan of care  []  Update interventions per flow sheet       []  Discharge due to:_  []  Other:_      Aung Rankin PT, DPT 3/23/2023

## 2023-03-28 ENCOUNTER — HOSPITAL ENCOUNTER (OUTPATIENT)
Dept: PHYSICAL THERAPY | Age: 48
Discharge: HOME | End: 2023-03-28
Payer: COMMERCIAL

## 2023-03-28 PROCEDURE — 97140 MANUAL THERAPY 1/> REGIONS: CPT | Performed by: PHYSICAL THERAPIST

## 2023-03-28 PROCEDURE — 97110 THERAPEUTIC EXERCISES: CPT | Performed by: PHYSICAL THERAPIST

## 2023-03-28 NOTE — PROGRESS NOTES
PT DAILY TREATMENT NOTE 2-15    Patient Name: Neil Fernandez  Date:3/28/2023  : 1975  [x]  Patient  Verified  Payor: Dee Fleming / Plan: Jay Blanco PPO / Product Type: PPO /    In time:   Out time:   Total Treatment Time (min): 47  Visit #:  10    Treatment Area: Pain in right shoulder [M25.511]    SUBJECTIVE  Pain Level (0-10 scale): 0  Any medication changes, allergies to medications, adverse drug reactions, diagnosis change, or new procedure performed?: [x] No    [] Yes (see summary sheet for update)  Subjective functional status/changes:   [] No changes reported  Shoulder has been doing well since last visit. OBJECTIVE     39 min Therapeutic Exercise:  [x] See flow sheet : PROM R shoulder flexion. ER and IR   Rationale: increase ROM, increase strength, improve coordination, improve balance, and increase proprioception to improve the patients ability to reach overhead     8 min Manual Therapy: Gd II posterior inferior GH mobs   Rationale: decrease pain, increase ROM, increase tissue extensibility, decrease edema , decrease trigger points, and increase postural awareness to improve the patients ability to increase mobility     With   [] TE   [] TA   [] Neuro   [] SC   [] other: Patient Education: [x] Review HEP    [] Progressed/Changed HEP based on:   [] positioning   [] body mechanics   [] transfers   [] heat/ice application    [] other:       Other Objective/Functional Measures: --     Pain Level (0-10 scale) post treatment: 0/10     ASSESSMENT/Changes in Function:   Some improvement with AAROM IR today. Worked in doorway stretching for ER as well.    Patient will continue to benefit from skilled PT services to modify and progress therapeutic interventions, address functional mobility deficits, address ROM deficits, address strength deficits, analyze and address soft tissue restrictions, analyze and cue movement patterns, analyze and modify body mechanics/ergonomics, assess and modify postural abnormalities, and instruct in home and community integration to attain remaining goals. []  See Plan of Care  []  See progress note/recertification  []  See Discharge Summary       Progress towards goals / Updated goals:  Long Term Goals:  To be accomplished in 12-16 treatments:               1. Patient will be able to achieve at least 70deg of passive ER for improved function PROGRESSING               2. Patient will be able to reach behind her back to put on her apron without pain NOT YET MET               3. Pt will be able to work on the floor at [facility] without increased pain PROGRESSING               4. Pt will be able to self-manage care using updated HEP for improved independence PROGRESSING               5. Improved FOTO score to 71 or better to demonstrate improved function     PLAN  [x]  Upgrade activities as tolerated     [x]  Continue plan of care  []  Update interventions per flow sheet       []  Discharge due to:_  []  Other:_      Robert Gomez, PT, DPT 3/28/2023

## 2023-03-30 ENCOUNTER — HOSPITAL ENCOUNTER (OUTPATIENT)
Dept: PHYSICAL THERAPY | Age: 48
End: 2023-03-30
Payer: COMMERCIAL

## 2023-03-30 PROCEDURE — 97110 THERAPEUTIC EXERCISES: CPT | Performed by: PHYSICAL THERAPIST

## 2023-03-30 PROCEDURE — 97140 MANUAL THERAPY 1/> REGIONS: CPT | Performed by: PHYSICAL THERAPIST

## 2023-03-30 NOTE — PROGRESS NOTES
PT DAILY TREATMENT NOTE 2-15    Patient Name: Yg Arechiga  Date:3/30/2023  : 1975  [x]  Patient  Verified  Payor: Andie Cain / Plan: Bharath Hameed PPO / Product Type: PPO /    In time: 1019a  Out time: 2982H  Total Treatment Time (min): 48  Visit #:  11    Treatment Area: Pain in right shoulder [M25.511]    SUBJECTIVE  Pain Level (0-10 scale): 0  Any medication changes, allergies to medications, adverse drug reactions, diagnosis change, or new procedure performed?: [x] No    [] Yes (see summary sheet for update)  Subjective functional status/changes:   [] No changes reported  Sore yesterday, but not too bad. Thinks she is about 80% recovered at this time. OBJECTIVE  40 min Therapeutic Exercise:  [x] See flow sheet : PROM R shoulder flexion. ER and IR   Rationale: increase ROM, increase strength, improve coordination, improve balance, and increase proprioception to improve the patients ability to reach overhead     8 min Manual Therapy: Gd II posterior inferior GH mobs   Rationale: decrease pain, increase ROM, increase tissue extensibility, decrease edema , decrease trigger points, and increase postural awareness to improve the patients ability to increase mobility     With   [] TE   [] TA   [] Neuro   [] SC   [] other: Patient Education: [x] Review HEP    [] Progressed/Changed HEP based on:   [] positioning   [] body mechanics   [] transfers   [] heat/ice application    [] other:       Other Objective/Functional Measures: IR: to T12     Pain Level (0-10 scale) post treatment: 0/10     ASSESSMENT/Changes in Function:   Showing some improvement with IR ROM.    Patient will continue to benefit from skilled PT services to modify and progress therapeutic interventions, address functional mobility deficits, address ROM deficits, address strength deficits, analyze and address soft tissue restrictions, analyze and cue movement patterns, analyze and modify body mechanics/ergonomics, assess and modify postural abnormalities, and instruct in home and community integration to attain remaining goals. []  See Plan of Care  []  See progress note/recertification  []  See Discharge Summary       Progress towards goals / Updated goals:  Long Term Goals:  To be accomplished in 12-16 treatments:               1. Patient will be able to achieve at least 70deg of passive ER for improved function PROGRESSING               2. Patient will be able to reach behind her back to put on her apron without pain NOT YET MET               3. Pt will be able to work on the floor at [facility] without increased pain PROGRESSING               4. Pt will be able to self-manage care using updated HEP for improved independence PROGRESSING               5. Improved FOTO score to 71 or better to demonstrate improved function     PLAN  [x]  Upgrade activities as tolerated     [x]  Continue plan of care  []  Update interventions per flow sheet       []  Discharge due to:_  []  Other:_      Therese Millard, PT, DPT 3/30/2023

## 2023-04-04 ENCOUNTER — HOSPITAL ENCOUNTER (OUTPATIENT)
Dept: PHYSICAL THERAPY | Age: 48
End: 2023-04-04
Payer: COMMERCIAL

## 2023-04-04 PROCEDURE — 97140 MANUAL THERAPY 1/> REGIONS: CPT | Performed by: PHYSICAL THERAPIST

## 2023-04-04 PROCEDURE — 97110 THERAPEUTIC EXERCISES: CPT | Performed by: PHYSICAL THERAPIST

## 2023-04-04 NOTE — PROGRESS NOTES
PT DAILY TREATMENT NOTE 2-15    Patient Name: Dulce Sofia  Date:2023  : 1975  [x]  Patient  Verified  Payor: Ana Paula Or / Plan: Genetta Burkitt PPO / Product Type: PPO /    In time: 747a  Out time: 018N  Total Treatment Time (min): 47  Visit #:  12    Treatment Area: Pain in right shoulder [M25.511]    SUBJECTIVE  Pain Level (0-10 scale): 0  Any medication changes, allergies to medications, adverse drug reactions, diagnosis change, or new procedure performed?: [x] No    [] Yes (see summary sheet for update)  Subjective functional status/changes:   [] No changes reported  Doing well today; no issues with the shoulder. OBJECTIVE  39 min Therapeutic Exercise:  [x] See flow sheet : PROM R shoulder flexion. ER and IR   Rationale: increase ROM, increase strength, improve coordination, improve balance, and increase proprioception to improve the patients ability to reach overhead     8 min Manual Therapy: Gd II posterior inferior GH mobs   Rationale: decrease pain, increase ROM, increase tissue extensibility, decrease edema , decrease trigger points, and increase postural awareness to improve the patients ability to increase mobility     With   [] TE   [] TA   [] Neuro   [] SC   [] other: Patient Education: [x] Review HEP    [] Progressed/Changed HEP based on:   [] positioning   [] body mechanics   [] transfers   [] heat/ice application    [] other:       Other Objective/Functional Measures: IR: to T11     Pain Level (0-10 scale) post treatment: 0/10     ASSESSMENT/Changes in Function:   Showing some improvement with IR ROM. Working on controlling scapular elevation with overhead lifting.    Patient will continue to benefit from skilled PT services to modify and progress therapeutic interventions, address functional mobility deficits, address ROM deficits, address strength deficits, analyze and address soft tissue restrictions, analyze and cue movement patterns, analyze and modify body mechanics/ergonomics, assess and modify postural abnormalities, and instruct in home and community integration to attain remaining goals. []  See Plan of Care  []  See progress note/recertification  []  See Discharge Summary       Progress towards goals / Updated goals:  Long Term Goals:  To be accomplished in 12-16 treatments:               1. Patient will be able to achieve at least 70deg of passive ER for improved function PROGRESSING               2. Patient will be able to reach behind her back to put on her apron without pain NOT YET MET               3. Pt will be able to work on the floor at [facility] without increased pain PROGRESSING               4. Pt will be able to self-manage care using updated HEP for improved independence PROGRESSING               5. Improved FOTO score to 71 or better to demonstrate improved function     PLAN  [x]  Upgrade activities as tolerated     [x]  Continue plan of care  []  Update interventions per flow sheet       []  Discharge due to:_  []  Other:_      Kevin Hunt, PT, DPT 4/4/2023

## 2023-04-18 ENCOUNTER — HOSPITAL ENCOUNTER (OUTPATIENT)
Dept: PHYSICAL THERAPY | Age: 48
Discharge: HOME | End: 2023-04-18
Payer: COMMERCIAL

## 2023-04-18 PROCEDURE — 97140 MANUAL THERAPY 1/> REGIONS: CPT | Performed by: PHYSICAL THERAPIST

## 2023-04-18 PROCEDURE — 97110 THERAPEUTIC EXERCISES: CPT | Performed by: PHYSICAL THERAPIST

## 2023-04-18 NOTE — PROGRESS NOTES
PT DAILY TREATMENT NOTE 2-15    Patient Name: Sana Hughes  Date:2023  : 1975  [x]  Patient  Verified  Payor: Sierra Reyes / Plan: Liz Rod PPO / Product Type: PPO /    In time: 1100a  Out time: 1147a  Total Treatment Time (min): 52  Visit #:  14    Treatment Area: Pain in right shoulder [M25.511]    SUBJECTIVE  Pain Level (0-10 scale): 0  Any medication changes, allergies to medications, adverse drug reactions, diagnosis change, or new procedure performed?: [x] No    [] Yes (see summary sheet for update)  Subjective functional status/changes:   [] No changes reported  Doing pretty well. She did fairly well with doing her pole class, but it was harder than usual and she did have some shoulder pain. OBJECTIVE    39 min Therapeutic Exercise:  [x] See flow sheet : PROM R shoulder flexion. ER and IR   Rationale: increase ROM, increase strength, improve coordination, improve balance, and increase proprioception to improve the patients ability to reach overhead     8 min Manual Therapy: Gd II posterior inferior GH mobs   Rationale: decrease pain, increase ROM, increase tissue extensibility, decrease edema , decrease trigger points, and increase postural awareness to improve the patients ability to increase mobility     With   [] TE   [] TA   [] Neuro   [] SC   [] other: Patient Education: [x] Review HEP    [] Progressed/Changed HEP based on:   [] positioning   [] body mechanics   [] transfers   [] heat/ice application    [] other:       Other Objective/Functional Measures: PROM ER: 75deg   FOTO: 75    Pain Level (0-10 scale) post treatment: 0/10     ASSESSMENT/Changes in Function:   Doing well overall and will have her tentatively try to self-manage at this time. If she doesn't need to return in 4 weeks, will discharge at this time.    Patient will continue to benefit from skilled PT services to modify and progress therapeutic interventions, address functional mobility deficits, address ROM deficits, address strength deficits, analyze and address soft tissue restrictions, analyze and cue movement patterns, analyze and modify body mechanics/ergonomics, assess and modify postural abnormalities, and instruct in home and community integration to attain remaining goals. []  See Plan of Care  []  See progress note/recertification  []  See Discharge Summary       Progress towards goals / Updated goals:  Long Term Goals:  To be accomplished in 12-16 treatments:               1. Patient will be able to achieve at least 70deg of passive ER for improved function MET               2. Patient will be able to reach behind her back to put on her apron without pain \"BETTER\"               3. Pt will be able to work on the floor at [facility] [place] without increased pain MET               4. Pt will be able to self-manage care using updated HEP for improved independence MOSTLY MET               5. Improved FOTO score to 71 or better to demonstrate improved function MET     PLAN  [x]  Upgrade activities as tolerated     [x]  Continue plan of care  []  Update interventions per flow sheet       []  Discharge due to:_  []  Other:_      Dois Ganesh, PT, DPT 4/18/2023